# Patient Record
Sex: FEMALE | Race: WHITE | NOT HISPANIC OR LATINO | URBAN - METROPOLITAN AREA
[De-identification: names, ages, dates, MRNs, and addresses within clinical notes are randomized per-mention and may not be internally consistent; named-entity substitution may affect disease eponyms.]

---

## 2018-04-04 ENCOUNTER — EMERGENCY (EMERGENCY)
Facility: HOSPITAL | Age: 68
LOS: 1 days | Discharge: HOME | End: 2018-04-04
Attending: EMERGENCY MEDICINE | Admitting: EMERGENCY MEDICINE
Payer: COMMERCIAL

## 2018-04-04 VITALS
OXYGEN SATURATION: 100 % | DIASTOLIC BLOOD PRESSURE: 90 MMHG | HEIGHT: 63 IN | RESPIRATION RATE: 16 BRPM | HEART RATE: 76 BPM | TEMPERATURE: 98 F | WEIGHT: 139.99 LBS | SYSTOLIC BLOOD PRESSURE: 160 MMHG

## 2018-04-04 VITALS
RESPIRATION RATE: 16 BRPM | HEART RATE: 69 BPM | OXYGEN SATURATION: 100 % | SYSTOLIC BLOOD PRESSURE: 152 MMHG | DIASTOLIC BLOOD PRESSURE: 93 MMHG | TEMPERATURE: 98 F

## 2018-04-04 PROCEDURE — 99283 EMERGENCY DEPT VISIT LOW MDM: CPT

## 2018-04-04 PROCEDURE — 99284 EMERGENCY DEPT VISIT MOD MDM: CPT

## 2018-04-04 RX ORDER — AZILSARTAN KAMEDOXOMIL AND CHLORTHALIDONE 40; 12.5 MG/1; MG/1
1 TABLET ORAL
Qty: 0 | Refills: 0 | COMMUNITY

## 2018-04-04 RX ORDER — VALACYCLOVIR 500 MG/1
1000 TABLET, FILM COATED ORAL ONCE
Qty: 0 | Refills: 0 | Status: COMPLETED | OUTPATIENT
Start: 2018-04-04 | End: 2018-04-04

## 2018-04-04 RX ORDER — VALACYCLOVIR 500 MG/1
1 TABLET, FILM COATED ORAL
Qty: 21 | Refills: 0 | OUTPATIENT
Start: 2018-04-04 | End: 2018-04-10

## 2018-04-04 RX ADMIN — VALACYCLOVIR 1000 MILLIGRAM(S): 500 TABLET, FILM COATED ORAL at 09:41

## 2018-04-04 NOTE — ED PROVIDER NOTE - OBJECTIVE STATEMENT
rt eye swollen and red state started last week seen at the urgent care center given Tobramycin eye drops state not better seem to be more swollen and itchy. rt eye swollen and red state started last week seen at the urgent care center Monday given Tobramycin eye drops state not better seem to be more swollen and itchy. yesterday notice macular lesion scattered rt orbital area including tip of the nose denies fever or significant pain. Patient has past medical history of HTN

## 2018-04-04 NOTE — ED PROVIDER NOTE - EYES, MLM
Clear bilaterally, pupils equal, round and reactive to light. rt eye has congestion and subconjunctival edema pup lPERLS clear no hyphema fluorescein stain shows no dendritic lesion

## 2018-04-04 NOTE — ED ADULT TRIAGE NOTE - CHIEF COMPLAINT QUOTE
" My right eye is not getting better, redness, swelling, teary since Sunday, I was seen at Urgent care Monday, rx for tobramycin "

## 2024-01-12 NOTE — ED ADULT TRIAGE NOTE - NS ED TRIAGE AVPU SCALE
Outreach attempt was made to schedule a Medicare Wellness Visit. This was the first attempt. Contact was made, [facility] appointment scheduled.  On 02/13/2024 Alert-The patient is alert, awake and responds to voice. The patient is oriented to time, place, and person. The triage nurse is able to obtain subjective information.

## 2024-08-27 NOTE — ED ADULT TRIAGE NOTE - STATUS:
"Patient's FSGs are controlled on current medication regimen.  Last A1c reviewed-   Lab Results   Component Value Date    HGBA1C 7.3 (H) 08/26/2024     Most recent fingerstick glucose reviewed- No results for input(s): "POCTGLUCOSE" in the last 24 hours.  Current correctional scale  Low  Maintain anti-hyperglycemic dose as follows-   Antihyperglycemics (From admission, onward)      Start     Stop Route Frequency Ordered    08/26/24 1715  metFORMIN tablet 1,000 mg         -- Oral 2 times daily with meals 08/26/24 1130    08/26/24 1353  insulin aspart U-100 injection 0-5 Units         -- SubQ Before meals & nightly PRN 08/26/24 1255    08/26/24 1145  dapagliflozin propanediol tablet 5 mg        Question Answer Comment   Does this patient have a diagnosis of heart failure? Yes    Does this patient have type 1 diabetes or diabetic ketoacidosis? No    Does this patient have symptomatic hypotension? No    Is the patient NPO or pending major surgery in next 3 days or less? No        -- Oral Daily 08/26/24 1130    08/26/24 1145  liraglutide 0.6 mg/0.1 mL (18 mg/3 mL) subq PNIJ pen 0.6 mg         -- SubQ Daily 08/26/24 1130          Hold Oral hypoglycemics while patient is in the hospital.  " Applied

## 2025-04-01 NOTE — ED ADULT NURSE NOTE - CCCP TRG CHIEF CMPLNT
eye redness on the discharge service for the patient. I have reviewed and made amendments to the documentation where necessary.